# Patient Record
Sex: MALE | Race: WHITE | NOT HISPANIC OR LATINO | Employment: OTHER | URBAN - METROPOLITAN AREA
[De-identification: names, ages, dates, MRNs, and addresses within clinical notes are randomized per-mention and may not be internally consistent; named-entity substitution may affect disease eponyms.]

---

## 2019-04-23 ENCOUNTER — HOSPITAL ENCOUNTER (OUTPATIENT)
Dept: RADIOLOGY | Facility: HOSPITAL | Age: 39
Discharge: HOME | End: 2019-04-23
Payer: MEDICARE

## 2019-04-23 ENCOUNTER — HOSPITAL ENCOUNTER (OUTPATIENT)
Dept: RADIOLOGY | Facility: HOSPITAL | Age: 39
Discharge: HOME | End: 2019-04-23

## 2019-04-23 ENCOUNTER — OFFICE VISIT (OUTPATIENT)
Dept: OBGYN CLINIC | Facility: HOSPITAL | Age: 39
End: 2019-04-23
Payer: MEDICARE

## 2019-04-23 VITALS
HEIGHT: 70 IN | BODY MASS INDEX: 26.48 KG/M2 | DIASTOLIC BLOOD PRESSURE: 80 MMHG | WEIGHT: 185 LBS | SYSTOLIC BLOOD PRESSURE: 118 MMHG | HEART RATE: 112 BPM

## 2019-04-23 DIAGNOSIS — Z96.9 PRESENCE OF RETAINED HARDWARE: ICD-10-CM

## 2019-04-23 DIAGNOSIS — M79.605 LEG PAIN, LEFT: ICD-10-CM

## 2019-04-23 DIAGNOSIS — M79.605 LEG PAIN, LEFT: Primary | ICD-10-CM

## 2019-04-23 PROCEDURE — 73630 X-RAY EXAM OF FOOT: CPT

## 2019-04-23 PROCEDURE — 73590 X-RAY EXAM OF LOWER LEG: CPT

## 2019-04-23 PROCEDURE — 99203 OFFICE O/P NEW LOW 30 MIN: CPT | Performed by: PHYSICIAN ASSISTANT

## 2021-01-27 ENCOUNTER — APPOINTMENT (OUTPATIENT)
Dept: RADIOLOGY | Facility: MEDICAL CENTER | Age: 41
End: 2021-01-27
Payer: MEDICARE

## 2021-01-27 ENCOUNTER — OFFICE VISIT (OUTPATIENT)
Dept: OBGYN CLINIC | Facility: MEDICAL CENTER | Age: 41
End: 2021-01-27
Payer: MEDICARE

## 2021-01-27 VITALS
DIASTOLIC BLOOD PRESSURE: 88 MMHG | HEIGHT: 70 IN | WEIGHT: 185 LBS | HEART RATE: 101 BPM | SYSTOLIC BLOOD PRESSURE: 126 MMHG | BODY MASS INDEX: 26.48 KG/M2

## 2021-01-27 DIAGNOSIS — M25.572 PAIN, JOINT, ANKLE AND FOOT, LEFT: ICD-10-CM

## 2021-01-27 DIAGNOSIS — G57.62 MORTON'S NEUROMA OF THIRD INTERSPACE OF LEFT FOOT: Primary | ICD-10-CM

## 2021-01-27 PROCEDURE — 20600 DRAIN/INJ JOINT/BURSA W/O US: CPT | Performed by: PHYSICAL MEDICINE & REHABILITATION

## 2021-01-27 PROCEDURE — 99214 OFFICE O/P EST MOD 30 MIN: CPT | Performed by: PHYSICAL MEDICINE & REHABILITATION

## 2021-01-27 PROCEDURE — 73630 X-RAY EXAM OF FOOT: CPT

## 2021-01-27 RX ADMIN — LIDOCAINE HYDROCHLORIDE 0.5 ML: 10 INJECTION, SOLUTION INFILTRATION; PERINEURAL at 16:12

## 2021-01-27 RX ADMIN — TRIAMCINOLONE ACETONIDE 20 MG: 40 INJECTION, SUSPENSION INTRA-ARTICULAR; INTRAMUSCULAR at 16:12

## 2021-01-27 NOTE — PATIENT INSTRUCTIONS
You should obtain a metatarsal pad or Pastor's neuroma pad  These can be found over-the-counter at any pharmacy or grocery store  Drena Ahumada should also carry them  You had a cortisone (steroid) injection  There are two medicines in this injection, a numbing medicine and a steroid  The numbing medication component usually takes effect within a few minutes and wears off after a few hours, after which your pain may return  The steroid medication typically takes effect in 3-5 days, although some people have benefits sooner, and lasts for a much longer time

## 2021-01-27 NOTE — PROGRESS NOTES
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     2  Pain, joint, ankle and foot, left  XR foot 3+ vw left     Orders Placed This Encounter   Procedures    Medium joint arthrocentesis    Small joint arthrocentesis    Medium joint arthrocentesis    XR foot 3+ vw left        Imaging Studies (I personally reviewed images in PACS and report):  Left foot x-rays most recent to this encounter reviewed  These images show surgical hardware  No acute osseous abnormalities  Impression:  Left foot pain likely secondary to Pastor's neuroma  We discussed different treatment options and decided to proceed with a steroid injection  Patient had immediate relief of his symptoms afterwards  He will obtain a Pastor's neuroma pad/metatarsal pad  I will see him back in 2-3 weeks to reassess  If no improvement, can consider having him go for an ultrasound evaluation  Can also consider having him see podiatry  Return in about 2 weeks (around 2/10/2021)  HPI:  Jaya Catherine is a 36 y o  male  who presents for evaluation of   Chief Complaint   Patient presents with    Left Foot - Pain       Onset/Mechanism: History of ORIF back in 2014 with Dr Yoselin Nunez  Location: Between the third and fourth toe at the bottom of the foot  Radiation: Denies  Provocative: Walking  Severity: Severe  Hard to walk because of this  Associated Symptoms: Denies  Treatment so far: Nothing  Review of Systems   Constitutional: Positive for activity change  Negative for fever  HENT: Negative for sore throat  Eyes: Negative for visual disturbance  Respiratory: Negative for shortness of breath  Cardiovascular: Negative for chest pain  Gastrointestinal: Negative for abdominal pain  Endocrine: Negative for polydipsia  Genitourinary: Negative for difficulty urinating  Musculoskeletal: Positive for arthralgias  Skin: Negative for rash  Allergic/Immunologic: Negative for immunocompromised state     Neurological: Positive for numbness  Hematological: Does not bruise/bleed easily  Psychiatric/Behavioral: Negative for confusion  Following history reviewed and updated:  History reviewed  No pertinent past medical history  Past Surgical History:   Procedure Laterality Date    BACK SURGERY  [id number] (6)    BLADDER STONE REMOVAL       Social History   Social History     Substance and Sexual Activity   Alcohol Use None     Social History     Substance and Sexual Activity   Drug Use Not on file     Social History     Tobacco Use   Smoking Status Current Every Day Smoker   Smokeless Tobacco Never Used     History reviewed  No pertinent family history  Allergies   Allergen Reactions    Nsaids         Constitutional:  /88 (BP Location: Right arm, Patient Position: Sitting, Cuff Size: Standard)   Pulse 101   Ht 5' 10" (1 778 m)   Wt 83 9 kg (185 lb)   BMI 26 54 kg/m²    General: NAD  Eyes: Anicteric sclerae  Neck: Supple  Lungs: Unlabored breathing  Cardiovascular: No lower extremity edema  Skin: Intact without erythema  Neurologic: Sensation intact to light touch  Psychiatric: Mood and affect are appropriate  Left Ankle Exam     Tenderness   Left ankle tenderness location: Tenderness between the 3rd and 4th metatarsal head  Swelling: none    Range of Motion   The patient has normal left ankle ROM  Muscle Strength   The patient has normal left ankle strength  Other   Erythema: absent  Scars: absent  Sensation: normal  Pulse: present    Comments:  Positive Mariann's click  Injection site was CDI  Small joint arthrocentesis  Universal Protocol:  Procedure performed by: (Medical assistant-Raúl)  Consent: Verbal consent obtained    Consent given by: patient  Timeout called at: 1/27/2021 4:09 PM   Patient understanding: patient states understanding of the procedure being performed    Supporting Documentation  Indications: pain and diagnostic evaluation   Procedure Details  Location: third toe - Third toe joint: Third interdigital webspace-between the 3rd and 4th metatarsal head  Needle size: 25 G  Ultrasound guidance: no  Medications administered: 0 5 mL lidocaine 1 %; 20 mg triamcinolone acetonide 40 mg/mL    Patient tolerance: patient tolerated the procedure well with no immediate complications    Patient tolerated the procedure well  He had immediate relief of his symptoms afterwards

## 2021-01-28 RX ORDER — TRIAMCINOLONE ACETONIDE 40 MG/ML
20 INJECTION, SUSPENSION INTRA-ARTICULAR; INTRAMUSCULAR
Status: COMPLETED | OUTPATIENT
Start: 2021-01-27 | End: 2021-01-27

## 2021-01-28 RX ORDER — LIDOCAINE HYDROCHLORIDE 10 MG/ML
0.5 INJECTION, SOLUTION INFILTRATION; PERINEURAL
Status: COMPLETED | OUTPATIENT
Start: 2021-01-27 | End: 2021-01-27

## 2021-02-10 ENCOUNTER — OFFICE VISIT (OUTPATIENT)
Dept: OBGYN CLINIC | Facility: MEDICAL CENTER | Age: 41
End: 2021-02-10
Payer: MEDICARE

## 2021-02-10 VITALS
BODY MASS INDEX: 26.48 KG/M2 | WEIGHT: 185 LBS | SYSTOLIC BLOOD PRESSURE: 131 MMHG | HEIGHT: 70 IN | HEART RATE: 111 BPM | DIASTOLIC BLOOD PRESSURE: 88 MMHG

## 2021-02-10 DIAGNOSIS — G57.62 MORTON'S NEUROMA OF THIRD INTERSPACE OF LEFT FOOT: ICD-10-CM

## 2021-02-10 DIAGNOSIS — M79.672 PAIN IN LEFT FOOT: Primary | ICD-10-CM

## 2021-02-10 PROCEDURE — 99214 OFFICE O/P EST MOD 30 MIN: CPT | Performed by: PHYSICAL MEDICINE & REHABILITATION

## 2021-02-10 RX ORDER — PREDNISONE 20 MG/1
40 TABLET ORAL
Qty: 10 TABLET | Refills: 0 | Status: SHIPPED | OUTPATIENT
Start: 2021-02-10 | End: 2021-02-15

## 2021-02-10 RX ORDER — GABAPENTIN 300 MG/1
300 CAPSULE ORAL
Qty: 60 CAPSULE | Refills: 1 | Status: SHIPPED | OUTPATIENT
Start: 2021-02-10 | End: 2021-02-10

## 2021-02-10 RX ORDER — GABAPENTIN 300 MG/1
300 CAPSULE ORAL
Qty: 90 CAPSULE | Refills: 1 | Status: SHIPPED | OUTPATIENT
Start: 2021-02-10

## 2021-02-10 NOTE — PROGRESS NOTES
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  US MSK limited    Ambulatory referral to Podiatry    predniSONE 20 mg tablet    gabapentin (NEURONTIN) 300 mg capsule    DISCONTINUED: gabapentin (NEURONTIN) 300 mg capsule     Orders Placed This Encounter   Procedures   [address] MSK limited    Ambulatory referral to Podiatry        Imaging Studies (I personally reviewed images in PACS and report):  Left foot x-rays most recent to this encounter reviewed  These images show surgical hardware  No acute osseous abnormalities      Impression:  Left foot pain likely secondary to Pastor's neuroma  We discussed different treatment options and decided to proceed with a steroid injection on his initial visit, 1/27/2021  He also obtained a metatarsal pad which was not helpful  The patient reports improvement a few hours after the injection but pain returned after the anesthetic wore off  We will have him go for an ultrasound study to assess for a neuroma  Afterwards, he will see podiatry  Patient reports not hindfoot pain as he has a history of ORIF by Dr Belén Brannon  I also prescribed him gabapentin and a medrol dose pack for his symptoms  Return if symptoms worsen or fail to improve  HPI:  Saima Ignacio is a 39 y o  male  who presents in follow up  Here for   Chief Complaint   Patient presents with    Left Foot - Follow-up       Date of injury: History of ORIF back in 2014 with Dr Belén Brannon  Trajectory of symptoms: After injection, he continues to have numbness  Review of Systems   Constitutional: Positive for activity change  Negative for fever  HENT: Negative for sore throat  Eyes: Negative for visual disturbance  Respiratory: Negative for shortness of breath  Cardiovascular: Negative for chest pain  Gastrointestinal: Negative for abdominal pain  Endocrine: Negative for polydipsia  Genitourinary: Negative for difficulty urinating  Musculoskeletal: Positive for arthralgias  Skin: Negative for rash  Allergic/Immunologic: Negative for immunocompromised state  Neurological: Negative for numbness  Hematological: Does not bruise/bleed easily  Psychiatric/Behavioral: Negative for confusion  Following history reviewed and updated:  History reviewed  No pertinent past medical history  Past Surgical History:   Procedure Laterality Date    BACK SURGERY  [id number] (6)    BLADDER STONE REMOVAL       Social History   Social History     Substance and Sexual Activity   Alcohol Use None     Social History     Substance and Sexual Activity   Drug Use Not on file     Social History     Tobacco Use   Smoking Status Current Every Day Smoker   Smokeless Tobacco Never Used     History reviewed  No pertinent family history  Allergies   Allergen Reactions    Nsaids         Constitutional:  /88 (BP Location: Right arm, Patient Position: Sitting, Cuff Size: Standard)   Pulse (!) 111   Ht 5' 10" (1 778 m)   Wt 83 9 kg (185 lb)   BMI 26 54 kg/m²    General: NAD  Eyes: Clear sclerae  ENT: No inflammation, lesion, or mass of lips  No tracheal deviation  Musculoskeletal: As mentioned below  Integumentary: No visible rashes or skin lesions  Pulmonary/Chest: Effort normal  No respiratory distress  Neuro: CN's grossly intact, ALMARAZ  Psych: Normal affect and judgement  Vascular: WWP  Left Ankle Exam     Tenderness   Left ankle tenderness location: TTP between 3rd and 4th metatarsal heads  Swelling: none    Range of Motion   The patient has normal left ankle ROM  Other   Erythema: absent  Scars: absent  Sensation: normal  Pulse: present    Comments:  Positive Mariann's click               Procedures

## 2021-02-10 NOTE — PATIENT INSTRUCTIONS
You have been prescribed a medication called gabapentin  This is a medication that needs to be taken on a consistent basis to work optimally  It is not a medication that you take "as needed "      Typically, it takes 2-4 weeks to get adequate pain relief from taking gabapentin  Typical side effects can include drowsiness and tiredness  This is why it is started at night and slowly increased until your pain is controlled  Do not operate heavy machinery until you know that you are tolerating this medication without side effects  You will continue this medication as prescribed until your follow up visit  Your pharmacist will also go over this important information with you

## 2021-06-03 DIAGNOSIS — M79.672 PAIN IN LEFT FOOT: ICD-10-CM

## 2021-06-03 DIAGNOSIS — G57.62 MORTON'S NEUROMA OF THIRD INTERSPACE OF LEFT FOOT: ICD-10-CM

## 2021-06-03 RX ORDER — GABAPENTIN 300 MG/1
CAPSULE ORAL
Qty: 90 CAPSULE | Refills: 1 | OUTPATIENT
Start: 2021-06-03

## 2021-06-09 ENCOUNTER — TELEPHONE (OUTPATIENT)
Dept: OBGYN CLINIC | Facility: HOSPITAL | Age: 41
End: 2021-06-09

## 2021-06-09 NOTE — TELEPHONE ENCOUNTER
Pt contacted Call Center requested refill of their medication  Medication Name:gabapentin (NEURONTIN) 300 mg capsule      Dosage of Med:      Frequency of Med:      Remaining Medication:       Pharmacy and Location: [facility] on file        Pt  Preferred Callback Phone Number: [phone]      Thank you

## 2021-06-10 NOTE — TELEPHONE ENCOUNTER
Patient has been advised of above  He asked if he should still come back to see us after podiatry sees him and he gets his ultrasounds completed  Please advise

## 2021-06-10 NOTE — TELEPHONE ENCOUNTER
Happy to see him but podiatry is more than capable of handling his foot issues  It would be redundant

## 2024-07-26 ENCOUNTER — OCCMED (OUTPATIENT)
Dept: URGENT CARE | Facility: CLINIC | Age: 44
End: 2024-07-26

## 2024-07-26 DIAGNOSIS — Z02.89 ENCOUNTER FOR EXAMINATION REQUIRED BY DEPARTMENT OF TRANSPORTATION (DOT): Primary | ICD-10-CM
